# Patient Record
Sex: FEMALE | Race: WHITE | URBAN - METROPOLITAN AREA
[De-identification: names, ages, dates, MRNs, and addresses within clinical notes are randomized per-mention and may not be internally consistent; named-entity substitution may affect disease eponyms.]

---

## 2021-01-21 ENCOUNTER — OFFICE VISIT (OUTPATIENT)
Dept: URBAN - METROPOLITAN AREA CLINIC 98 | Facility: CLINIC | Age: 61
End: 2021-01-21

## 2021-02-02 ENCOUNTER — OFFICE VISIT (OUTPATIENT)
Dept: URBAN - METROPOLITAN AREA CLINIC 98 | Facility: CLINIC | Age: 61
End: 2021-02-02
Payer: COMMERCIAL

## 2021-02-02 VITALS
HEIGHT: 65 IN | TEMPERATURE: 96.8 F | SYSTOLIC BLOOD PRESSURE: 128 MMHG | HEART RATE: 83 BPM | DIASTOLIC BLOOD PRESSURE: 74 MMHG | BODY MASS INDEX: 28.92 KG/M2 | WEIGHT: 173.6 LBS

## 2021-02-02 DIAGNOSIS — K58.9 IBS (IRRITABLE BOWEL SYNDROME) DIARRHEA PREDOMINANT: ICD-10-CM

## 2021-02-02 DIAGNOSIS — F41.9 ANXIETY: ICD-10-CM

## 2021-02-02 DIAGNOSIS — R15.9 FECAL INCONTINENCE: ICD-10-CM

## 2021-02-02 DIAGNOSIS — K55.9 ISCHEMIC COLITIS: ICD-10-CM

## 2021-02-02 PROCEDURE — G8420 CALC BMI NORM PARAMETERS: HCPCS | Performed by: INTERNAL MEDICINE

## 2021-02-02 PROCEDURE — G8482 FLU IMMUNIZE ORDER/ADMIN: HCPCS | Performed by: INTERNAL MEDICINE

## 2021-02-02 PROCEDURE — 99214 OFFICE O/P EST MOD 30 MIN: CPT | Performed by: INTERNAL MEDICINE

## 2021-02-02 PROCEDURE — G8427 DOCREV CUR MEDS BY ELIG CLIN: HCPCS | Performed by: INTERNAL MEDICINE

## 2021-02-02 PROCEDURE — 3017F COLORECTAL CA SCREEN DOC REV: CPT | Performed by: INTERNAL MEDICINE

## 2021-02-02 RX ORDER — PANTOPRAZOLE SODIUM 40 MG/1
TAKE ONE TABLET BY MOUTH TWICE A DAY FOR 30 DAYS TABLET, DELAYED RELEASE ORAL
Qty: 60 | Refills: 2 | Status: ACTIVE | COMMUNITY
Start: 2020-03-16

## 2021-02-02 RX ORDER — CLONAZEPAM 1 MG/1
TAKE 1 TABLET BY ORAL ROUTE ONCE A DAY (AT BEDTIME) TABLET ORAL 1
Qty: 0 | Refills: 0 | Status: ACTIVE | COMMUNITY
Start: 1900-01-01

## 2021-02-02 RX ORDER — COLESTIPOL HYDROCHLORIDE 1 G/1
TAKE 2 TABLETS (2 GRAM) BY ORAL ROUTE 2 TIMES PER DAY SWALLOWING WHOLE WITH ANY LIQUID. DO NOT CRUSH, CHEW AND/OR DIVIDE TABLET ORAL 2
Qty: 0 | Refills: 0 | Status: ACTIVE | COMMUNITY
Start: 1900-01-01

## 2021-02-02 RX ORDER — DIPHENHYDRAMINE HCL 2 %
TAKE 3 CAPSULES BY ORAL ROUTE ONCE A DAY (AT BEDTIME) CREAM (GRAM) TOPICAL 1
Qty: 0 | Refills: 0 | Status: ACTIVE | COMMUNITY
Start: 1900-01-01

## 2021-02-02 RX ORDER — HYOSCYAMINE SULFATE 0.12 MG/1
TABLET, ORALLY DISINTEGRATING ORAL
Qty: 0 | Refills: 0 | Status: ACTIVE | COMMUNITY
Start: 1900-01-01

## 2021-02-02 RX ORDER — MELATONIN 5 MG
TAKE 3 CAPSULES BY ORAL ROUTE ONCE A DAY (AT BEDTIME) CAPSULE ORAL 1
Qty: 0 | Refills: 0 | Status: ACTIVE | COMMUNITY
Start: 1900-01-01

## 2021-02-02 RX ORDER — HYOSCYAMINE SULFATE 0.12 MG/1
1 TABLET, ORALLY DISINTEGRATING ORAL
Qty: 180 | Refills: 1

## 2021-02-02 RX ORDER — CHLORHEXIDINE GLUCONATE 4 %
TAKE 1 TABLET BY ORAL ROUTE DAILY LIQUID (ML) TOPICAL 1
Qty: 0 | Refills: 0 | Status: ACTIVE | COMMUNITY
Start: 1900-01-01

## 2021-02-02 RX ORDER — SACCHAROMYCES BOULARDII 250 MG
CAPSULE ORAL
Qty: 0 | Refills: 0 | Status: ACTIVE | COMMUNITY
Start: 1900-01-01

## 2021-02-02 RX ORDER — BUPROPION HYDROCHLORIDE 200 MG/1
TAKE 1 TABLET (200 MG) BY ORAL ROUTE 2 TIMES PER DAY TABLET, FILM COATED ORAL 2
Qty: 0 | Refills: 0 | Status: ACTIVE | COMMUNITY
Start: 1900-01-01

## 2021-02-02 NOTE — HPI-TODAY'S VISIT:
Patient has history of ischemic colitis in the past.  Patient has history of GI bleeding secondary to NSAIDs.  Status post cholecystectomy.  Gastric ulcer.  History of fecal incontinence.  Patient had colonoscopy November 2019.  Scar noted descending colon and splenic flexure most likely from prior ischemic colitis.  Diverticulosis.  No polyp or neoplasm.  Abdominal CAT scan August 2018.  Hepatic cysts.  Status post cholecystectomy.  Pancreas normal.  Hiatal hernia. 2/2/21 fecal incontinence intermittent diarrhea cannot work due to problems with cramping and incontinence cognitive issues stress causes above no blood in stool

## 2021-02-02 NOTE — PHYSICAL EXAM GASTROINTESTINAL
Abdomen , soft, nontender, nondistended , no guarding or rigidity , no masses palpable , normal bowel sounds , Liver and Spleen , no hepatomegaly present , no hepatosplenomegaly , liver nontender , spleen not palpableLARGE SCAR , Abdomen , soft, nontender, nondistended , no guarding or rigidity , no masses palpable , normal bowel sounds , Liver and Spleen , no hepatomegaly present , no hepatosplenomegaly , liver nontender , spleen not palpable

## 2021-03-11 ENCOUNTER — TELEPHONE ENCOUNTER (OUTPATIENT)
Dept: URBAN - METROPOLITAN AREA CLINIC 98 | Facility: CLINIC | Age: 61
End: 2021-03-11

## 2021-03-11 RX ORDER — COLESTIPOL HYDROCHLORIDE 1 G/1
2 TABLETS TABLET ORAL TWICE A DAY
Qty: 120 TABLET | Refills: 0

## 2021-03-15 ENCOUNTER — TELEPHONE ENCOUNTER (OUTPATIENT)
Dept: URBAN - METROPOLITAN AREA CLINIC 98 | Facility: CLINIC | Age: 61
End: 2021-03-15

## 2021-03-15 RX ORDER — SUCRALFATE 1 G/1
TAKE 1 TABLET (1 GRAM) BY ORAL ROUTE 2 TIMES PER DAY ON AN EMPTY STOMACH AND AT BEDTIME TABLET ORAL TWICE A DAY
Qty: 60 | Refills: 3
End: 2021-07-13

## 2021-04-05 ENCOUNTER — OFFICE VISIT (OUTPATIENT)
Dept: URBAN - METROPOLITAN AREA TELEHEALTH 2 | Facility: TELEHEALTH | Age: 61
End: 2021-04-05
Payer: COMMERCIAL

## 2021-04-05 DIAGNOSIS — K58.0 IRRITABLE BOWEL SYNDROME WITH DIARRHEA: ICD-10-CM

## 2021-04-05 DIAGNOSIS — F41.9 ANXIETY: ICD-10-CM

## 2021-04-05 DIAGNOSIS — R15.9 FECAL INCONTINENCE: ICD-10-CM

## 2021-04-05 DIAGNOSIS — K55.9 ISCHEMIC COLITIS: ICD-10-CM

## 2021-04-05 PROBLEM — 48694002 ANXIETY: Status: ACTIVE | Noted: 2021-02-02

## 2021-04-05 PROBLEM — 10743008 IRRITABLE BOWEL SYNDROME: Status: ACTIVE | Noted: 2021-02-02

## 2021-04-05 PROBLEM — 30588004 ISCHEMIC COLITIS: Status: ACTIVE | Noted: 2021-02-02

## 2021-04-05 PROCEDURE — 99213 OFFICE O/P EST LOW 20 MIN: CPT | Performed by: INTERNAL MEDICINE

## 2021-04-05 RX ORDER — HYOSCYAMINE SULFATE 0.12 MG/1
1 TABLET, ORALLY DISINTEGRATING ORAL
Qty: 180 | Refills: 1

## 2021-04-05 RX ORDER — CLONAZEPAM 1 MG/1
TAKE 1 TABLET BY ORAL ROUTE ONCE A DAY (AT BEDTIME) TABLET ORAL 1
Qty: 0 | Refills: 0 | Status: ACTIVE | COMMUNITY
Start: 1900-01-01

## 2021-04-05 RX ORDER — PANTOPRAZOLE SODIUM 40 MG/1
TAKE ONE TABLET BY MOUTH TWICE A DAY FOR 30 DAYS TABLET, DELAYED RELEASE ORAL
Qty: 60 | Refills: 2 | Status: ACTIVE | COMMUNITY
Start: 2020-03-16

## 2021-04-05 RX ORDER — CHLORHEXIDINE GLUCONATE 4 %
TAKE 1 TABLET BY ORAL ROUTE DAILY LIQUID (ML) TOPICAL 1
Qty: 0 | Refills: 0 | Status: ACTIVE | COMMUNITY
Start: 1900-01-01

## 2021-04-05 RX ORDER — DIPHENHYDRAMINE HCL 2 %
TAKE 3 CAPSULES BY ORAL ROUTE ONCE A DAY (AT BEDTIME) CREAM (GRAM) TOPICAL 1
Qty: 0 | Refills: 0 | Status: ACTIVE | COMMUNITY
Start: 1900-01-01

## 2021-04-05 RX ORDER — COLESTIPOL HYDROCHLORIDE 1 G/1
2 TABLETS TABLET ORAL TWICE A DAY
Qty: 120 TABLET | Refills: 0 | Status: ACTIVE | COMMUNITY

## 2021-04-05 RX ORDER — MELATONIN 5 MG
TAKE 3 CAPSULES BY ORAL ROUTE ONCE A DAY (AT BEDTIME) CAPSULE ORAL 1
Qty: 0 | Refills: 0 | Status: ACTIVE | COMMUNITY
Start: 1900-01-01

## 2021-04-05 RX ORDER — HYOSCYAMINE SULFATE 0.12 MG/1
1 TABLET, ORALLY DISINTEGRATING ORAL
Qty: 180 | Refills: 1 | Status: ACTIVE | COMMUNITY

## 2021-04-05 RX ORDER — SUCRALFATE 1 G/1
TAKE 1 TABLET (1 GRAM) BY ORAL ROUTE 2 TIMES PER DAY ON AN EMPTY STOMACH AND AT BEDTIME TABLET ORAL TWICE A DAY
Qty: 60 | Refills: 3 | Status: ACTIVE | COMMUNITY
End: 2021-07-13

## 2021-04-05 RX ORDER — SACCHAROMYCES BOULARDII 250 MG
CAPSULE ORAL
Qty: 0 | Refills: 0 | Status: ACTIVE | COMMUNITY
Start: 1900-01-01

## 2021-04-05 RX ORDER — PANTOPRAZOLE SODIUM 40 MG/1
TAKE ONE TABLET BY MOUTH TWICE A DAY FOR 30 DAYS TABLET, DELAYED RELEASE ORAL BID
Qty: 180 | Refills: 2

## 2021-04-05 RX ORDER — BUPROPION HYDROCHLORIDE 200 MG/1
TAKE 1 TABLET (200 MG) BY ORAL ROUTE 2 TIMES PER DAY TABLET, FILM COATED ORAL 2
Qty: 0 | Refills: 0 | Status: ACTIVE | COMMUNITY
Start: 1900-01-01

## 2021-04-05 NOTE — HPI-TODAY'S VISIT:
Patient has history of ischemic colitis in the past.  Patient has history of GI bleeding secondary to NSAIDs.  Status post cholecystectomy.  Gastric ulcer.  History of fecal incontinence.  Patient had colonoscopy November 2019.  Scar noted descending colon and splenic flexure most likely from prior ischemic colitis.  Diverticulosis.  No polyp or neoplasm.  Abdominal CAT scan August 2018.  Hepatic cysts.  Status post cholecystectomy.  Pancreas normal.  Hiatal hernia. 2/2/21 fecal incontinence intermittent diarrhea cannot work due to problems with cramping and incontinence cognitive issues stress causes above no blood in stool 4/5/21 epigastric pain sucralfate and prilosec starting to help

## 2021-04-19 ENCOUNTER — TELEPHONE ENCOUNTER (OUTPATIENT)
Dept: URBAN - METROPOLITAN AREA CLINIC 98 | Facility: CLINIC | Age: 61
End: 2021-04-19

## 2021-04-21 ENCOUNTER — OFFICE VISIT (OUTPATIENT)
Dept: URBAN - METROPOLITAN AREA SURGERY CENTER 18 | Facility: SURGERY CENTER | Age: 61
End: 2021-04-21
Payer: COMMERCIAL

## 2021-04-21 DIAGNOSIS — K29.30 CHRONIC SUPERFICIAL GASTRITIS: ICD-10-CM

## 2021-04-21 PROCEDURE — 43239 EGD BIOPSY SINGLE/MULTIPLE: CPT | Performed by: INTERNAL MEDICINE

## 2021-04-21 PROCEDURE — G8907 PT DOC NO EVENTS ON DISCHARG: HCPCS | Performed by: INTERNAL MEDICINE

## 2021-04-21 RX ORDER — CLONAZEPAM 1 MG/1
TAKE 1 TABLET BY ORAL ROUTE ONCE A DAY (AT BEDTIME) TABLET ORAL 1
Qty: 0 | Refills: 0 | Status: ACTIVE | COMMUNITY
Start: 1900-01-01

## 2021-04-21 RX ORDER — COLESTIPOL HYDROCHLORIDE 1 G/1
2 TABLETS TABLET ORAL TWICE A DAY
Qty: 120 TABLET | Refills: 0 | Status: ACTIVE | COMMUNITY

## 2021-04-21 RX ORDER — SACCHAROMYCES BOULARDII 250 MG
CAPSULE ORAL
Qty: 0 | Refills: 0 | Status: ACTIVE | COMMUNITY
Start: 1900-01-01

## 2021-04-21 RX ORDER — MELATONIN 5 MG
TAKE 3 CAPSULES BY ORAL ROUTE ONCE A DAY (AT BEDTIME) CAPSULE ORAL 1
Qty: 0 | Refills: 0 | Status: ACTIVE | COMMUNITY
Start: 1900-01-01

## 2021-04-21 RX ORDER — DIPHENHYDRAMINE HCL 2 %
TAKE 3 CAPSULES BY ORAL ROUTE ONCE A DAY (AT BEDTIME) CREAM (GRAM) TOPICAL 1
Qty: 0 | Refills: 0 | Status: ACTIVE | COMMUNITY
Start: 1900-01-01

## 2021-04-21 RX ORDER — HYOSCYAMINE SULFATE 0.12 MG/1
1 TABLET, ORALLY DISINTEGRATING ORAL
Qty: 180 | Refills: 1 | Status: ACTIVE | COMMUNITY

## 2021-04-21 RX ORDER — CHLORHEXIDINE GLUCONATE 4 %
TAKE 1 TABLET BY ORAL ROUTE DAILY LIQUID (ML) TOPICAL 1
Qty: 0 | Refills: 0 | Status: ACTIVE | COMMUNITY
Start: 1900-01-01

## 2021-04-21 RX ORDER — SUCRALFATE 1 G/1
TAKE 1 TABLET (1 GRAM) BY ORAL ROUTE 2 TIMES PER DAY ON AN EMPTY STOMACH AND AT BEDTIME TABLET ORAL TWICE A DAY
Qty: 60 | Refills: 3 | Status: ACTIVE | COMMUNITY
End: 2021-07-13

## 2021-04-21 RX ORDER — BUPROPION HYDROCHLORIDE 200 MG/1
TAKE 1 TABLET (200 MG) BY ORAL ROUTE 2 TIMES PER DAY TABLET, FILM COATED ORAL 2
Qty: 0 | Refills: 0 | Status: ACTIVE | COMMUNITY
Start: 1900-01-01

## 2021-04-21 RX ORDER — PANTOPRAZOLE SODIUM 40 MG/1
TAKE ONE TABLET BY MOUTH TWICE A DAY FOR 30 DAYS TABLET, DELAYED RELEASE ORAL BID
Qty: 180 | Refills: 2 | Status: ACTIVE | COMMUNITY

## 2021-04-23 ENCOUNTER — TELEPHONE ENCOUNTER (OUTPATIENT)
Dept: URBAN - METROPOLITAN AREA CLINIC 98 | Facility: CLINIC | Age: 61
End: 2021-04-23

## 2021-04-23 RX ORDER — SUCRALFATE 1 G/1
TAKE 1 TABLET (1 GRAM) BY ORAL ROUTE 2 TIMES PER DAY ON AN EMPTY STOMACH AND AT BEDTIME TABLET ORAL QID
Qty: 36 | Refills: 3
End: 2022-04-18

## 2021-04-26 ENCOUNTER — TELEPHONE ENCOUNTER (OUTPATIENT)
Dept: URBAN - METROPOLITAN AREA CLINIC 98 | Facility: CLINIC | Age: 61
End: 2021-04-26

## 2021-04-26 RX ORDER — SUCRALFATE 1 G/1
TAKE 1 TABLET (1 GRAM) BY ORAL ROUTE 2 TIMES PER DAY ON AN EMPTY STOMACH AND AT BEDTIME TABLET ORAL QID
Qty: 360 | Refills: 3
End: 2022-04-21

## 2021-04-29 ENCOUNTER — TELEPHONE ENCOUNTER (OUTPATIENT)
Dept: URBAN - METROPOLITAN AREA CLINIC 3 | Facility: CLINIC | Age: 61
End: 2021-04-29

## 2021-04-29 RX ORDER — SUCRALFATE 1 G/1
TID TABLET ORAL TID
Qty: 270 | Refills: 1
End: 2021-10-26

## 2021-05-24 ENCOUNTER — OFFICE VISIT (OUTPATIENT)
Dept: URBAN - METROPOLITAN AREA CLINIC 98 | Facility: CLINIC | Age: 61
End: 2021-05-24
Payer: COMMERCIAL

## 2021-05-24 ENCOUNTER — TELEPHONE ENCOUNTER (OUTPATIENT)
Dept: URBAN - METROPOLITAN AREA CLINIC 98 | Facility: CLINIC | Age: 61
End: 2021-05-24

## 2021-05-24 VITALS
WEIGHT: 177.6 LBS | HEIGHT: 65 IN | SYSTOLIC BLOOD PRESSURE: 126 MMHG | TEMPERATURE: 97.8 F | BODY MASS INDEX: 29.59 KG/M2 | HEART RATE: 82 BPM | DIASTOLIC BLOOD PRESSURE: 74 MMHG

## 2021-05-24 DIAGNOSIS — K29.90 GASTRITIS AND DUODENITIS: ICD-10-CM

## 2021-05-24 DIAGNOSIS — R15.9 FECAL INCONTINENCE: ICD-10-CM

## 2021-05-24 DIAGNOSIS — K44.9 HIATAL HERNIA: ICD-10-CM

## 2021-05-24 PROCEDURE — 99214 OFFICE O/P EST MOD 30 MIN: CPT | Performed by: INTERNAL MEDICINE

## 2021-05-24 RX ORDER — HYOSCYAMINE SULFATE 0.12 MG/1
1 TABLET, ORALLY DISINTEGRATING ORAL
Qty: 180 | Refills: 1
End: 2021-11-20

## 2021-05-24 RX ORDER — BUPROPION HYDROCHLORIDE 200 MG/1
TAKE 1 TABLET (200 MG) BY ORAL ROUTE 2 TIMES PER DAY TABLET, FILM COATED ORAL 2
Qty: 0 | Refills: 0 | Status: ACTIVE | COMMUNITY
Start: 1900-01-01

## 2021-05-24 RX ORDER — MELATONIN 5 MG
TAKE 3 CAPSULES BY ORAL ROUTE ONCE A DAY (AT BEDTIME) CAPSULE ORAL 1
Qty: 0 | Refills: 0 | Status: ACTIVE | COMMUNITY
Start: 1900-01-01

## 2021-05-24 RX ORDER — HYOSCYAMINE SULFATE 0.12 MG/1
1 TABLET, ORALLY DISINTEGRATING ORAL
Qty: 180 | Refills: 1 | Status: ACTIVE | COMMUNITY

## 2021-05-24 RX ORDER — SUCRALFATE 1 G/1
TID TABLET ORAL TID
Qty: 270 | Refills: 1 | Status: ACTIVE | COMMUNITY
End: 2021-10-26

## 2021-05-24 RX ORDER — SUCRALFATE 1 G/1
TID TABLET ORAL TID
Qty: 270 | Refills: 1
End: 2021-11-20

## 2021-05-24 RX ORDER — COLESTIPOL HYDROCHLORIDE 1 G/1
2 TABLETS TABLET ORAL TWICE A DAY
Qty: 120 TABLET | Refills: 0 | Status: ACTIVE | COMMUNITY

## 2021-05-24 RX ORDER — PANTOPRAZOLE SODIUM 40 MG/1
TAKE ONE TABLET BY MOUTH TWICE A DAY FOR 30 DAYS TABLET, DELAYED RELEASE ORAL BID
Qty: 180 | Refills: 2

## 2021-05-24 RX ORDER — CHLORHEXIDINE GLUCONATE 4 %
TAKE 1 TABLET BY ORAL ROUTE DAILY LIQUID (ML) TOPICAL 1
Qty: 0 | Refills: 0 | Status: ACTIVE | COMMUNITY
Start: 1900-01-01

## 2021-05-24 RX ORDER — CLONAZEPAM 1 MG/1
TAKE 1 TABLET BY ORAL ROUTE ONCE A DAY (AT BEDTIME) TABLET ORAL 1
Qty: 0 | Refills: 0 | Status: ACTIVE | COMMUNITY
Start: 1900-01-01

## 2021-05-24 RX ORDER — SACCHAROMYCES BOULARDII 250 MG
CAPSULE ORAL
Qty: 0 | Refills: 0 | Status: ACTIVE | COMMUNITY
Start: 1900-01-01

## 2021-05-24 RX ORDER — DIPHENHYDRAMINE HCL 2 %
TAKE 3 CAPSULES BY ORAL ROUTE ONCE A DAY (AT BEDTIME) CREAM (GRAM) TOPICAL 1
Qty: 0 | Refills: 0 | Status: ACTIVE | COMMUNITY
Start: 1900-01-01

## 2021-05-24 RX ORDER — PANTOPRAZOLE SODIUM 40 MG/1
TAKE ONE TABLET BY MOUTH TWICE A DAY FOR 30 DAYS TABLET, DELAYED RELEASE ORAL BID
Qty: 180 | Refills: 2 | Status: ACTIVE | COMMUNITY

## 2021-05-24 NOTE — HPI-TODAY'S VISIT:
Patient has history of ischemic colitis in the past.  Patient has history of GI bleeding secondary to NSAIDs.  Status post cholecystectomy.  Gastric ulcer.  History of fecal incontinence.  Patient had colonoscopy November 2019.  Scar noted descending colon and splenic flexure most likely from prior ischemic colitis.  Diverticulosis.  No polyp or neoplasm.  Abdominal CAT scan August 2018.  Hepatic cysts.  Status post cholecystectomy.  Pancreas normal.  Hiatal hernia. 2/2/21 fecal incontinence intermittent diarrhea cannot work due to problems with cramping and incontinence cognitive issues stress causes above no blood in stool 4/5/21 epigastric pain sucralfate and prilosec starting to help EGD was done April 21, 2021 revealing large hiatal hernia. Gastric erosions. Biopsies revealed mild chronic inactive gastritis. Negative for H. pylori. 5/24/21 Ziti red sause icecream stress cause mid abd pain

## 2021-05-26 LAB
A/G RATIO: 2.2
ALBUMIN: 4.6
ALKALINE PHOSPHATASE: 73
ALT (SGPT): 16
AST (SGOT): 18
BILIRUBIN, TOTAL: 0.3
BUN/CREATININE RATIO: 14
BUN: 16
CALCIUM: 10
CARBON DIOXIDE, TOTAL: 24
CHLORIDE: 106
CREATININE: 1.12
EGFR IF AFRICN AM: 61
EGFR IF NONAFRICN AM: 53
GLOBULIN, TOTAL: 2.1
GLUCOSE: 97
HEMATOCRIT: 44
HEMOGLOBIN: 14.9
MCH: 32.1
MCHC: 33.9
MCV: 95
NRBC: (no result)
PLATELETS: 303
POTASSIUM: 4.1
PROTEIN, TOTAL: 6.7
RBC: 4.64
RDW: 12.4
SODIUM: 144
WBC: 5.6

## 2021-09-15 ENCOUNTER — OFFICE VISIT (OUTPATIENT)
Dept: URBAN - METROPOLITAN AREA CLINIC 96 | Facility: CLINIC | Age: 61
End: 2021-09-15

## 2021-09-15 RX ORDER — HYOSCYAMINE SULFATE 0.12 MG/1
1 TABLET, ORALLY DISINTEGRATING ORAL
Qty: 180 | Refills: 1 | Status: ACTIVE | COMMUNITY
End: 2021-11-20

## 2021-09-15 RX ORDER — SUCRALFATE 1 G/1
TID TABLET ORAL TID
Qty: 270 | Refills: 1 | Status: ACTIVE | COMMUNITY
End: 2021-11-20

## 2021-09-15 RX ORDER — CHLORHEXIDINE GLUCONATE 4 %
TAKE 1 TABLET BY ORAL ROUTE DAILY LIQUID (ML) TOPICAL 1
Qty: 0 | Refills: 0 | Status: ACTIVE | COMMUNITY
Start: 1900-01-01

## 2021-09-15 RX ORDER — CLONAZEPAM 1 MG/1
TAKE 1 TABLET BY ORAL ROUTE ONCE A DAY (AT BEDTIME) TABLET ORAL 1
Qty: 0 | Refills: 0 | Status: ACTIVE | COMMUNITY
Start: 1900-01-01

## 2021-09-15 RX ORDER — MELATONIN 5 MG
TAKE 3 CAPSULES BY ORAL ROUTE ONCE A DAY (AT BEDTIME) CAPSULE ORAL 1
Qty: 0 | Refills: 0 | Status: ACTIVE | COMMUNITY
Start: 1900-01-01

## 2021-09-15 RX ORDER — PANTOPRAZOLE SODIUM 40 MG/1
TAKE ONE TABLET BY MOUTH TWICE A DAY FOR 30 DAYS TABLET, DELAYED RELEASE ORAL BID
Qty: 180 | Refills: 2 | Status: ACTIVE | COMMUNITY

## 2021-09-15 RX ORDER — BUPROPION HYDROCHLORIDE 200 MG/1
TAKE 1 TABLET (200 MG) BY ORAL ROUTE 2 TIMES PER DAY TABLET, FILM COATED ORAL 2
Qty: 0 | Refills: 0 | Status: ACTIVE | COMMUNITY
Start: 1900-01-01

## 2021-09-15 RX ORDER — SACCHAROMYCES BOULARDII 250 MG
CAPSULE ORAL
Qty: 0 | Refills: 0 | Status: ACTIVE | COMMUNITY
Start: 1900-01-01

## 2021-09-15 RX ORDER — DIPHENHYDRAMINE HCL 2 %
TAKE 3 CAPSULES BY ORAL ROUTE ONCE A DAY (AT BEDTIME) CREAM (GRAM) TOPICAL 1
Qty: 0 | Refills: 0 | Status: ACTIVE | COMMUNITY
Start: 1900-01-01

## 2021-09-15 RX ORDER — COLESTIPOL HYDROCHLORIDE 1 G/1
2 TABLETS TABLET ORAL TWICE A DAY
Qty: 120 TABLET | Refills: 0 | Status: ACTIVE | COMMUNITY

## 2021-12-14 ENCOUNTER — WEB ENCOUNTER (OUTPATIENT)
Dept: URBAN - METROPOLITAN AREA CLINIC 96 | Facility: CLINIC | Age: 61
End: 2021-12-14

## 2021-12-14 ENCOUNTER — OFFICE VISIT (OUTPATIENT)
Dept: URBAN - METROPOLITAN AREA CLINIC 96 | Facility: CLINIC | Age: 61
End: 2021-12-14
Payer: COMMERCIAL

## 2021-12-14 VITALS
TEMPERATURE: 98.6 F | BODY MASS INDEX: 29.99 KG/M2 | WEIGHT: 180 LBS | HEART RATE: 91 BPM | DIASTOLIC BLOOD PRESSURE: 78 MMHG | SYSTOLIC BLOOD PRESSURE: 126 MMHG | HEIGHT: 65 IN

## 2021-12-14 DIAGNOSIS — K44.9 HIATAL HERNIA: ICD-10-CM

## 2021-12-14 DIAGNOSIS — R19.7 DIARRHEA, UNSPECIFIED TYPE: ICD-10-CM

## 2021-12-14 DIAGNOSIS — Z87.11 HISTORY OF GASTRIC ULCER: ICD-10-CM

## 2021-12-14 DIAGNOSIS — Z87.19 HISTORY OF ISCHEMIC COLITIS: ICD-10-CM

## 2021-12-14 DIAGNOSIS — R15.9 FECAL INCONTINENCE: ICD-10-CM

## 2021-12-14 DIAGNOSIS — K58.9 IRRITABLE BOWEL SYNDROME, UNSPECIFIED TYPE: ICD-10-CM

## 2021-12-14 DIAGNOSIS — K21.9 GASTROESOPHAGEAL REFLUX DISEASE, UNSPECIFIED WHETHER ESOPHAGITIS PRESENT: ICD-10-CM

## 2021-12-14 DIAGNOSIS — K29.90 GASTRITIS AND DUODENITIS: ICD-10-CM

## 2021-12-14 PROCEDURE — 99214 OFFICE O/P EST MOD 30 MIN: CPT | Performed by: INTERNAL MEDICINE

## 2021-12-14 RX ORDER — BUPROPION HYDROCHLORIDE 200 MG/1
TAKE 1 TABLET (200 MG) BY ORAL ROUTE 2 TIMES PER DAY TABLET, FILM COATED ORAL 2
Qty: 0 | Refills: 0 | Status: ACTIVE | COMMUNITY
Start: 1900-01-01

## 2021-12-14 RX ORDER — SACCHAROMYCES BOULARDII 250 MG
CAPSULE ORAL
Qty: 0 | Refills: 0 | Status: ACTIVE | COMMUNITY
Start: 1900-01-01

## 2021-12-14 RX ORDER — HYOSCYAMINE SULFATE 0.12 MG/1
1 TABLET AS NEEDED TABLET ORAL
Qty: 45 TABLET | Refills: 6 | OUTPATIENT
Start: 2021-12-14 | End: 2022-07-12

## 2021-12-14 RX ORDER — PANTOPRAZOLE SODIUM 40 MG/1
1 TABLET TABLET, DELAYED RELEASE ORAL ONCE A DAY
Qty: 90 TABLET | Refills: 4 | OUTPATIENT
Start: 2021-12-14

## 2021-12-14 RX ORDER — CHLORHEXIDINE GLUCONATE 4 %
TAKE 1 TABLET BY ORAL ROUTE DAILY LIQUID (ML) TOPICAL 1
Qty: 0 | Refills: 0 | Status: ACTIVE | COMMUNITY
Start: 1900-01-01

## 2021-12-14 RX ORDER — CLONAZEPAM 1 MG/1
TAKE 1 TABLET BY ORAL ROUTE ONCE A DAY (AT BEDTIME) TABLET ORAL 1
Qty: 0 | Refills: 0 | Status: ACTIVE | COMMUNITY
Start: 1900-01-01

## 2021-12-14 RX ORDER — PANTOPRAZOLE SODIUM 40 MG/1
TAKE ONE TABLET BY MOUTH TWICE A DAY FOR 30 DAYS TABLET, DELAYED RELEASE ORAL BID
Qty: 180 | Refills: 2 | Status: ACTIVE | COMMUNITY

## 2021-12-14 RX ORDER — COLESTIPOL HYDROCHLORIDE 1 G/1
2 TABLETS TABLET ORAL TWICE A DAY
Qty: 120 TABLET | Refills: 0 | Status: ACTIVE | COMMUNITY

## 2021-12-14 RX ORDER — DIPHENHYDRAMINE HCL 2 %
TAKE 3 CAPSULES BY ORAL ROUTE ONCE A DAY (AT BEDTIME) CREAM (GRAM) TOPICAL 1
Qty: 0 | Refills: 0 | Status: ACTIVE | COMMUNITY
Start: 1900-01-01

## 2021-12-14 RX ORDER — MELATONIN 5 MG
TAKE 3 CAPSULES BY ORAL ROUTE ONCE A DAY (AT BEDTIME) CAPSULE ORAL 1
Qty: 0 | Refills: 0 | Status: ACTIVE | COMMUNITY
Start: 1900-01-01

## 2021-12-14 NOTE — HPI-TODAY'S VISIT:
61 old female long established patient of Dr. Alatorre last seen by him in clinic 5/24/2021.  Now presents for "I want to change doctors".  Per prior notation, prior history of ischemic colitis as well as GI bleeding secondary to NSAID use due to gastric ulcer.  History of fecal incontinence.     Prior colonoscopy November 2019 with scarring noted in the descending colon and splenic flexure likely from prior ischemic colitis.  Diverticulosis, no polyps noted.  EGD 4/21/2021 demonstrated large hiatal hernia, gastric erosions with biopsies negative for H. pylori. Still taking pantoprazole BID and Carafate QID.   Prior history of fecal incontinence as well as intermittent diarrhea.  At prior visit, patient was provided prescription for sucralfate and pantoprazole and advised to follow-up in 3 months.  Labs with creatinine mildly elevated 1.12, normal liver function tests.  CBC White cell count 5.6, hemoglobin normal 14.9.  Patient reports ongoing problems since 2016 when she was inpatient and treated for ischemic colitis per patient. Patient reports has been having intermittent fecal incontinence with intermittent cramping and pain and intermittent diarrhea. Improved with prn Levsin. No rectal bleeding, no unintentional weight loss, no nocturnal sx.   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 Takes meds for anxiety and depression. No SI or HI. Followed by psychiatrist every 3 months.   UTD with primary MD with normal labs, does have osteoporosis in right hip on recent bone density. Takes Tramadol prn HA. Still with occasional Goody's powder.

## 2022-05-25 ENCOUNTER — OUT OF OFFICE VISIT (OUTPATIENT)
Dept: URBAN - METROPOLITAN AREA MEDICAL CENTER 25 | Facility: MEDICAL CENTER | Age: 62
End: 2022-05-25
Payer: COMMERCIAL

## 2022-05-25 DIAGNOSIS — R10.13 ABDOMINAL DISCOMFORT, EPIGASTRIC: ICD-10-CM

## 2022-05-25 DIAGNOSIS — K29.60 ADENOPAPILLOMATOSIS GASTRICA: ICD-10-CM

## 2022-05-25 DIAGNOSIS — Z79.82 ASPIRIN LONG-TERM USE: ICD-10-CM

## 2022-05-25 DIAGNOSIS — K92.1 ACUTE MELENA: ICD-10-CM

## 2022-05-25 PROCEDURE — 43239 EGD BIOPSY SINGLE/MULTIPLE: CPT | Performed by: INTERNAL MEDICINE

## 2022-05-25 PROCEDURE — G8427 DOCREV CUR MEDS BY ELIG CLIN: HCPCS | Performed by: INTERNAL MEDICINE

## 2022-05-25 PROCEDURE — 99222 1ST HOSP IP/OBS MODERATE 55: CPT | Performed by: INTERNAL MEDICINE

## 2022-05-31 ENCOUNTER — TELEPHONE ENCOUNTER (OUTPATIENT)
Dept: URBAN - METROPOLITAN AREA CLINIC 23 | Facility: CLINIC | Age: 62
End: 2022-05-31

## 2022-07-21 ENCOUNTER — WEB ENCOUNTER (OUTPATIENT)
Dept: URBAN - METROPOLITAN AREA CLINIC 96 | Facility: CLINIC | Age: 62
End: 2022-07-21

## 2022-07-27 ENCOUNTER — OFFICE VISIT (OUTPATIENT)
Dept: URBAN - METROPOLITAN AREA CLINIC 96 | Facility: CLINIC | Age: 62
End: 2022-07-27
Payer: COMMERCIAL

## 2022-07-27 VITALS
WEIGHT: 195 LBS | HEIGHT: 65 IN | OXYGEN SATURATION: 96 % | SYSTOLIC BLOOD PRESSURE: 130 MMHG | DIASTOLIC BLOOD PRESSURE: 90 MMHG | HEART RATE: 83 BPM | TEMPERATURE: 98 F | BODY MASS INDEX: 32.49 KG/M2 | RESPIRATION RATE: 17 BRPM

## 2022-07-27 DIAGNOSIS — Z87.11 HISTORY OF GASTRIC ULCER: ICD-10-CM

## 2022-07-27 DIAGNOSIS — K44.9 HIATAL HERNIA: ICD-10-CM

## 2022-07-27 DIAGNOSIS — D64.9 ANEMIA, UNSPECIFIED TYPE: ICD-10-CM

## 2022-07-27 DIAGNOSIS — R15.9 FECAL INCONTINENCE: ICD-10-CM

## 2022-07-27 DIAGNOSIS — K29.90 GASTRITIS AND DUODENITIS: ICD-10-CM

## 2022-07-27 DIAGNOSIS — R19.7 DIARRHEA, UNSPECIFIED TYPE: ICD-10-CM

## 2022-07-27 DIAGNOSIS — Z87.19 HISTORY OF ISCHEMIC COLITIS: ICD-10-CM

## 2022-07-27 DIAGNOSIS — K21.9 GASTROESOPHAGEAL REFLUX DISEASE, UNSPECIFIED WHETHER ESOPHAGITIS PRESENT: ICD-10-CM

## 2022-07-27 DIAGNOSIS — K58.9 IRRITABLE BOWEL SYNDROME, UNSPECIFIED TYPE: ICD-10-CM

## 2022-07-27 PROBLEM — 196731005: Status: ACTIVE | Noted: 2021-05-24

## 2022-07-27 PROCEDURE — 99213 OFFICE O/P EST LOW 20 MIN: CPT | Performed by: INTERNAL MEDICINE

## 2022-07-27 RX ORDER — CLONAZEPAM 1 MG/1
TAKE 1 TABLET BY ORAL ROUTE ONCE A DAY (AT BEDTIME) TABLET ORAL 1
Qty: 0 | Refills: 0 | Status: ACTIVE | COMMUNITY
Start: 1900-01-01

## 2022-07-27 RX ORDER — SACCHAROMYCES BOULARDII 250 MG
CAPSULE ORAL
Qty: 0 | Refills: 0 | Status: ACTIVE | COMMUNITY
Start: 1900-01-01

## 2022-07-27 RX ORDER — PANTOPRAZOLE SODIUM 40 MG/1
TAKE ONE TABLET BY MOUTH TWICE A DAY FOR 30 DAYS TABLET, DELAYED RELEASE ORAL BID
Qty: 180 | Refills: 2 | Status: ON HOLD | COMMUNITY

## 2022-07-27 RX ORDER — QUETIAPINE 50 MG/1
1 TABLET AT BEDTIME TABLET, FILM COATED ORAL ONCE A DAY
Status: ACTIVE | COMMUNITY

## 2022-07-27 RX ORDER — MELATONIN 5 MG
TAKE 3 CAPSULES BY ORAL ROUTE ONCE A DAY (AT BEDTIME) CAPSULE ORAL 1
Qty: 0 | Refills: 0 | Status: ACTIVE | COMMUNITY
Start: 1900-01-01

## 2022-07-27 RX ORDER — BUPROPION HYDROCHLORIDE 200 MG/1
TAKE 1 TABLET (200 MG) BY ORAL ROUTE 2 TIMES PER DAY TABLET, FILM COATED ORAL 2
Qty: 0 | Refills: 0 | Status: ACTIVE | COMMUNITY
Start: 1900-01-01

## 2022-07-27 RX ORDER — DIPHENHYDRAMINE HCL 2 %
TAKE 3 CAPSULES BY ORAL ROUTE ONCE A DAY (AT BEDTIME) CREAM (GRAM) TOPICAL 1
Qty: 0 | Refills: 0 | Status: ACTIVE | COMMUNITY
Start: 1900-01-01

## 2022-07-27 RX ORDER — HYOSCYAMINE SULFATE 0.12 MG/5ML
5 ML AS NEEDED LIQUID ORAL
Status: ACTIVE | COMMUNITY

## 2022-07-27 RX ORDER — CHLORHEXIDINE GLUCONATE 4 %
TAKE 1 TABLET BY ORAL ROUTE DAILY LIQUID (ML) TOPICAL 1
Qty: 0 | Refills: 0 | Status: ACTIVE | COMMUNITY
Start: 1900-01-01

## 2022-07-27 RX ORDER — PANTOPRAZOLE SODIUM 40 MG/1
1 TABLET TABLET, DELAYED RELEASE ORAL ONCE A DAY
Qty: 90 TABLET | Refills: 4 | Status: ACTIVE | COMMUNITY
Start: 2021-12-14

## 2022-07-27 RX ORDER — SUCRALFATE 1 G/1
1 TABLET ON AN EMPTY STOMACH TABLET ORAL TWICE A DAY
Status: ACTIVE | COMMUNITY

## 2022-07-27 RX ORDER — COLESTIPOL HYDROCHLORIDE 1 G/1
2 TABLETS TABLET ORAL TWICE A DAY
Qty: 120 TABLET | Refills: 0 | Status: ACTIVE | COMMUNITY

## 2022-07-27 NOTE — HPI-TODAY'S VISIT:
61 old female long established patient of Dr. Alatorre last seen by him in clinic 5/24/2021.  Seen by myself 12/24/2021. Per prior notation, prior history of ischemic colitis as well as GI bleeding secondary to NSAID use due to gastric ulcer.    Colonoscopy November 2019 with scarring noted in the descending colon and splenic flexure likely from prior ischemic colitis.  Diverticulosis, no polyps noted.  EGD 4/21/2021 demonstrated large hiatal hernia, gastric erosions with biopsies negative for H. pylori. Still taking pantoprazole BID and Carafate QID.   Prior history of fecal incontinence as well as intermittent diarrhea.  At prior visit, patient was provided prescription for sucralfate and pantoprazole.  Patient reports ongoing problems since 2016 when she was inpatient and treated for ischemic colitis per patient. Patient reports has been having intermittent fecal incontinence with intermittent cramping and pain and intermittent diarrhea. Improved with prn Levsin.   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 Takes meds for anxiety and depression. No SI or HI. Followed by psychiatrist every 3 months.   Osteoporosis in right hip on recent bone density. Takes Tramadol prn HA.   Patient was seen as an inpatient consultation by Dr. Parra 5/25/22 after presenting with melena and epigastric pain.  Did report taking Goody's powder as needed headaches.  Noted anemic on admission.  CT imaging demonstrated large hiatal hernia.  EGD performed by Dr. Parra at [facility] 5/25/2022 with large hiatal hernia, gastritis.  Protonix was recommended.  Pathology with gastric biopsies demonstrating chronic inactive gastritis, no H. pylori organisms.  She presents for follow up. She denies any n/v/melena/rectal bleeding/dysphagia/odynophagia. Labs done with primary MD a few weeks ago with improved Hb per patient. No ASA use. No alcohol.

## 2022-10-05 ENCOUNTER — CLAIMS CREATED FROM THE CLAIM WINDOW (OUTPATIENT)
Dept: URBAN - METROPOLITAN AREA CLINIC 19 | Facility: CLINIC | Age: 62
End: 2022-10-05
Payer: COMMERCIAL

## 2022-10-05 ENCOUNTER — WEB ENCOUNTER (OUTPATIENT)
Dept: URBAN - METROPOLITAN AREA CLINIC 19 | Facility: CLINIC | Age: 62
End: 2022-10-05

## 2022-10-05 ENCOUNTER — LAB OUTSIDE AN ENCOUNTER (OUTPATIENT)
Dept: URBAN - METROPOLITAN AREA CLINIC 19 | Facility: CLINIC | Age: 62
End: 2022-10-05

## 2022-10-05 VITALS
SYSTOLIC BLOOD PRESSURE: 126 MMHG | WEIGHT: 196.2 LBS | HEIGHT: 65 IN | HEART RATE: 95 BPM | BODY MASS INDEX: 32.69 KG/M2 | DIASTOLIC BLOOD PRESSURE: 78 MMHG | OXYGEN SATURATION: 98 % | TEMPERATURE: 97.9 F

## 2022-10-05 DIAGNOSIS — Z87.19 HISTORY OF ISCHEMIC COLITIS: ICD-10-CM

## 2022-10-05 DIAGNOSIS — R19.7 DIARRHEA, UNSPECIFIED TYPE: ICD-10-CM

## 2022-10-05 DIAGNOSIS — R15.9 FECAL INCONTINENCE: ICD-10-CM

## 2022-10-05 DIAGNOSIS — K44.9 HIATAL HERNIA: ICD-10-CM

## 2022-10-05 DIAGNOSIS — Z87.11 HISTORY OF GASTRIC ULCER: ICD-10-CM

## 2022-10-05 DIAGNOSIS — K58.9 IRRITABLE BOWEL SYNDROME, UNSPECIFIED TYPE: ICD-10-CM

## 2022-10-05 DIAGNOSIS — R10.30 LOWER ABDOMINAL PAIN: ICD-10-CM

## 2022-10-05 DIAGNOSIS — R10.84 ABDOMINAL CRAMPING, GENERALIZED: ICD-10-CM

## 2022-10-05 DIAGNOSIS — K58.0 IBS-D: ICD-10-CM

## 2022-10-05 PROBLEM — 10743008: Status: ACTIVE | Noted: 2021-12-14

## 2022-10-05 LAB
ABSOLUTE NRBC COUNT: 0
AG RATIO: 2
ALBUMIN LEVEL: 4.4
ALK PHOS: 95
ALT: 15
ANION GAP: 9
AST: 17
BILIRUBIN TOTAL: 0.3
BUN/CREAT RATIO: 15
BUN: 17
CALCIUM LEVEL: 9.4
CHLORIDE LEVEL: 109
CO2 LEVEL: 21
CREATININE LEVEL: 1.1
CREATININE POC: 1
CRP: 0.56
ESR WESTERGREN: 13
GFR 2021: 56
GLUCOSE LEVEL: 96
HCT: 42.8
HGB: 14.1
MCH: 30.5
MCHC: 32.9
MCV: 92.4
MPV: 10.4
NRBC AUTO: 0
OSMO (CALC): 279
PERFORMING LAB: (no result)
PLATELETS: 316
POTASSIUM LEVEL: 4.1
PROTEIN TOTAL: 7.4
RBC: 4.63
RDW: 15.9
SODIUM LEVEL: 139
WBC: 6.4

## 2022-10-05 PROCEDURE — 99214 OFFICE O/P EST MOD 30 MIN: CPT | Performed by: NURSE PRACTITIONER

## 2022-10-05 PROCEDURE — 99214 OFFICE O/P EST MOD 30 MIN: CPT | Performed by: STUDENT IN AN ORGANIZED HEALTH CARE EDUCATION/TRAINING PROGRAM

## 2022-10-05 RX ORDER — MELATONIN 5 MG
TAKE 3 CAPSULES BY ORAL ROUTE ONCE A DAY (AT BEDTIME) CAPSULE ORAL 1
Qty: 0 | Refills: 0 | Status: ACTIVE | COMMUNITY
Start: 1900-01-01

## 2022-10-05 RX ORDER — CHLORHEXIDINE GLUCONATE 4 %
TAKE 1 TABLET BY ORAL ROUTE DAILY LIQUID (ML) TOPICAL 1
Qty: 0 | Refills: 0 | Status: ACTIVE | COMMUNITY
Start: 1900-01-01

## 2022-10-05 RX ORDER — COLESTIPOL HYDROCHLORIDE 1 G/1
2 TABLETS TABLET ORAL TWICE A DAY
Qty: 120 TABLET | Refills: 0 | Status: ACTIVE | COMMUNITY

## 2022-10-05 RX ORDER — PANTOPRAZOLE SODIUM 40 MG/1
1 TABLET TABLET, DELAYED RELEASE ORAL ONCE A DAY
Qty: 90 TABLET | Refills: 4 | Status: ACTIVE | COMMUNITY
Start: 2021-12-14

## 2022-10-05 RX ORDER — PANTOPRAZOLE SODIUM 40 MG/1
TAKE ONE TABLET BY MOUTH TWICE A DAY FOR 30 DAYS TABLET, DELAYED RELEASE ORAL BID
Qty: 180 | Refills: 2 | Status: ON HOLD | COMMUNITY

## 2022-10-05 RX ORDER — DIPHENHYDRAMINE HCL 2 %
TAKE 3 CAPSULES BY ORAL ROUTE ONCE A DAY (AT BEDTIME) CREAM (GRAM) TOPICAL 1
Qty: 0 | Refills: 0 | Status: ACTIVE | COMMUNITY
Start: 1900-01-01

## 2022-10-05 RX ORDER — BUPROPION HYDROCHLORIDE 200 MG/1
TAKE 1 TABLET (200 MG) BY ORAL ROUTE 2 TIMES PER DAY TABLET, FILM COATED ORAL 2
Qty: 0 | Refills: 0 | Status: ACTIVE | COMMUNITY
Start: 1900-01-01

## 2022-10-05 RX ORDER — SACCHAROMYCES BOULARDII 250 MG
CAPSULE ORAL
Qty: 0 | Refills: 0 | Status: ACTIVE | COMMUNITY
Start: 1900-01-01

## 2022-10-05 RX ORDER — HYOSCYAMINE SULFATE 0.12 MG/5ML
5 ML AS NEEDED LIQUID ORAL
Status: ACTIVE | COMMUNITY

## 2022-10-05 RX ORDER — SUCRALFATE 1 G/1
1 TABLET ON AN EMPTY STOMACH TABLET ORAL TWICE A DAY
Status: ACTIVE | COMMUNITY

## 2022-10-05 RX ORDER — QUETIAPINE 50 MG/1
1 TABLET AT BEDTIME TABLET, FILM COATED ORAL ONCE A DAY
Status: ACTIVE | COMMUNITY

## 2022-10-05 RX ORDER — CLONAZEPAM 1 MG/1
TAKE 1 TABLET BY ORAL ROUTE ONCE A DAY (AT BEDTIME) TABLET ORAL 1
Qty: 0 | Refills: 0 | Status: ACTIVE | COMMUNITY
Start: 1900-01-01

## 2022-10-05 NOTE — PHYSICAL EXAM GASTROINTESTINAL
Abdomen , soft, RLQ tenderness, nondistended , no guarding or rigidity , normal bowel sounds , no hepatomegaly present

## 2022-10-05 NOTE — HPI-TODAY'S VISIT:
Michelle is a 62-year-old female who presents to the office for diarrhea and fecal incontinence. She was previously seen at Dr. Nieves on 7/27/2022 for hospital follow-up.  She was in the hospital May/2022 for melena and epigastric pain, did report taking Goody powders at that time.  CT scan showed large hiatal hernia.  EGD performed by Dr. Jacobs on 5/25/2022 showed large hiatal hernia, gastritis with biopsy showing inactive gastritis, negative for H. pylori She has had diarrhea and fecal incontinence for years, was placed on Levsin at her office visit with DR Orourke.  Today she presents for urgent same day appointment for lower abdominal pain, fecal incontinence. Her pain started last friday, dull ache/sharp, bloating, constant, nothing makes it better/worse. Stools are loose, having mushy loose stools throughout the day with constant rectal leakage. Denies bloody stools. She has taken 1 dose of her colestipol today, no difference yet.  She had bladder function test last week, and had to take a dose of Cipro, symptoms started after her procedure.   She has not completed stool studies or had any imaging recently.     . Summary of previous note: She has a history of ischemic colitis and GI bleeding secondary to NSAID use/gastric ulcer.   Colonoscopy 11/2019 showed scarring in the descending colon and splenic flexure likely from previous ischemic colitis, diverticulosis EGD 4/2021 large hiatal hernia, gastric erosions.  Biopsies were negative gallbladder has been removed

## 2022-11-01 ENCOUNTER — OFFICE VISIT (OUTPATIENT)
Dept: URBAN - METROPOLITAN AREA CLINIC 96 | Facility: CLINIC | Age: 62
End: 2022-11-01

## 2022-11-29 ENCOUNTER — OFFICE VISIT (OUTPATIENT)
Dept: URBAN - METROPOLITAN AREA CLINIC 96 | Facility: CLINIC | Age: 62
End: 2022-11-29

## 2023-01-19 ENCOUNTER — WEB ENCOUNTER (OUTPATIENT)
Dept: URBAN - METROPOLITAN AREA CLINIC 98 | Facility: CLINIC | Age: 63
End: 2023-01-19

## 2023-01-19 ENCOUNTER — OFFICE VISIT (OUTPATIENT)
Dept: URBAN - METROPOLITAN AREA CLINIC 98 | Facility: CLINIC | Age: 63
End: 2023-01-19
Payer: COMMERCIAL

## 2023-01-19 VITALS
TEMPERATURE: 97.2 F | HEART RATE: 81 BPM | BODY MASS INDEX: 32.46 KG/M2 | HEIGHT: 65 IN | SYSTOLIC BLOOD PRESSURE: 142 MMHG | DIASTOLIC BLOOD PRESSURE: 83 MMHG | WEIGHT: 194.8 LBS

## 2023-01-19 DIAGNOSIS — R19.7 DIARRHEA, UNSPECIFIED TYPE: ICD-10-CM

## 2023-01-19 DIAGNOSIS — K21.9 GASTROESOPHAGEAL REFLUX DISEASE, UNSPECIFIED WHETHER ESOPHAGITIS PRESENT: ICD-10-CM

## 2023-01-19 DIAGNOSIS — K44.9 HIATAL HERNIA: ICD-10-CM

## 2023-01-19 DIAGNOSIS — R15.9 FECAL INCONTINENCE: ICD-10-CM

## 2023-01-19 DIAGNOSIS — R10.30 LOWER ABDOMINAL PAIN: ICD-10-CM

## 2023-01-19 PROBLEM — 235595009: Status: ACTIVE | Noted: 2021-12-14

## 2023-01-19 PROCEDURE — 99213 OFFICE O/P EST LOW 20 MIN: CPT

## 2023-01-19 RX ORDER — BUPROPION HYDROCHLORIDE 200 MG/1
TAKE 1 TABLET (200 MG) BY ORAL ROUTE 2 TIMES PER DAY TABLET, FILM COATED ORAL 2
Qty: 0 | Refills: 0 | Status: ACTIVE | COMMUNITY
Start: 1900-01-01

## 2023-01-19 RX ORDER — PANTOPRAZOLE SODIUM 40 MG/1
TAKE ONE TABLET BY MOUTH TWICE A DAY FOR 30 DAYS TABLET, DELAYED RELEASE ORAL BID
Qty: 180 | Refills: 2 | Status: ON HOLD | COMMUNITY

## 2023-01-19 RX ORDER — DIPHENHYDRAMINE HCL 2 %
TAKE 3 CAPSULES BY ORAL ROUTE ONCE A DAY (AT BEDTIME) CREAM (GRAM) TOPICAL 1
Qty: 0 | Refills: 0 | Status: ACTIVE | COMMUNITY
Start: 1900-01-01

## 2023-01-19 RX ORDER — CLONAZEPAM 1 MG/1
TAKE 1 TABLET BY ORAL ROUTE ONCE A DAY (AT BEDTIME) TABLET ORAL 1
Qty: 0 | Refills: 0 | Status: ACTIVE | COMMUNITY
Start: 1900-01-01

## 2023-01-19 RX ORDER — QUETIAPINE 50 MG/1
1 TABLET AT BEDTIME TABLET, FILM COATED ORAL ONCE A DAY
Status: ACTIVE | COMMUNITY

## 2023-01-19 RX ORDER — MELATONIN 5 MG
TAKE 3 CAPSULES BY ORAL ROUTE ONCE A DAY (AT BEDTIME) CAPSULE ORAL 1
Qty: 0 | Refills: 0 | Status: ACTIVE | COMMUNITY
Start: 1900-01-01

## 2023-01-19 RX ORDER — HYOSCYAMINE SULFATE 0.12 MG/5ML
5 ML AS NEEDED LIQUID ORAL
Status: ON HOLD | COMMUNITY

## 2023-01-19 RX ORDER — SUCRALFATE 1 G/1
1 TABLET ON AN EMPTY STOMACH TABLET ORAL TWICE A DAY
Status: ACTIVE | COMMUNITY

## 2023-01-19 RX ORDER — CHLORHEXIDINE GLUCONATE 4 %
TAKE 1 TABLET BY ORAL ROUTE DAILY LIQUID (ML) TOPICAL 1
Qty: 0 | Refills: 0 | Status: ACTIVE | COMMUNITY
Start: 1900-01-01

## 2023-01-19 RX ORDER — COLESTIPOL HYDROCHLORIDE 1 G/1
2 TABLETS TABLET ORAL TWICE A DAY
Qty: 120 TABLET | Refills: 0 | Status: ACTIVE | COMMUNITY

## 2023-01-19 RX ORDER — SACCHAROMYCES BOULARDII 250 MG
CAPSULE ORAL
Qty: 0 | Refills: 0 | Status: ACTIVE | COMMUNITY
Start: 1900-01-01

## 2023-01-19 RX ORDER — PANTOPRAZOLE SODIUM 40 MG/1
1 TABLET TABLET, DELAYED RELEASE ORAL ONCE A DAY
Qty: 90 TABLET | Refills: 4 | Status: ACTIVE | COMMUNITY
Start: 2021-12-14

## 2023-01-19 NOTE — HPI-TODAY'S VISIT:
Patient is a 62-year-old female who presents to discuss diarrhea.   Has a past medical history of ischemic colitis as well as GI bleeding secondary to NSAID use due to gastric ulcer.  Colonoscopy completed November 2019 with scarring noted in the descending colon and splenic flexure likely from ischemic colitis.  Diverticulosis with no polyps noted.   EGD completed 4/21/2021 which demonstrated large hiatal hernia, gastric erosions with biopsies negative for H. pylori.   Currently taking pantoprazole once a day and Carafate 4 times a day.   Repeat upper endoscopy was completed inpatient on 5/25/2022 due to melena and epigastric pain.  Findings included large hiatal hernia, gastritis.  Protonix was recommended.  Pathology with gastric biopsies demonstrating chronic inactive gastritis with no H. pylori organisms.   Previously been prescribed Levsin for intermittent episodes of diarrhea.    Last seen on 10/5/2022  by Barbara Jimenez for diarrhea and fecal incontinence.   Stool studies were ordered but were not completed.  Labs were done which revealed normal sed rate.  CRP mildly elevated at 0.565.  Blood counts found to be stable with a normal hemoglobin at 14.1 and hematocrit 42.8.   She was advised to increase colestipol to twice a day.   Stat CT scan was done on 10/5/2022 which revealed no acute abnormalities identified in the abdomen or pelvis.  Large hiatal hernia noted and nonobstructive left lower pole renal calculus. Just established care with CRS Dr. Rocha who found patient to have rectocele. Recommended she pursue pelvic floor therapy which she has done for the past few weeks. Does not feels he has had any improvement  She reports she does not feel urge to have a BM and will just have incontinence episode  She will have a BM every 3 days at best Can have straining with BMs  Denies BRBPR or melena  Recently has been Diagnosed with ASD with psychiatry  Diagnosed with sjogrens by rheumatologist

## 2023-02-13 ENCOUNTER — WEB ENCOUNTER (OUTPATIENT)
Dept: URBAN - METROPOLITAN AREA CLINIC 96 | Facility: CLINIC | Age: 63
End: 2023-02-13

## 2023-02-14 ENCOUNTER — WEB ENCOUNTER (OUTPATIENT)
Dept: URBAN - METROPOLITAN AREA CLINIC 96 | Facility: CLINIC | Age: 63
End: 2023-02-14

## 2023-03-29 ENCOUNTER — OFFICE VISIT (OUTPATIENT)
Dept: URBAN - METROPOLITAN AREA CLINIC 96 | Facility: CLINIC | Age: 63
End: 2023-03-29

## 2023-04-03 ENCOUNTER — TELEPHONE ENCOUNTER (OUTPATIENT)
Dept: URBAN - METROPOLITAN AREA CLINIC 98 | Facility: CLINIC | Age: 63
End: 2023-04-03

## 2023-04-04 ENCOUNTER — WEB ENCOUNTER (OUTPATIENT)
Dept: URBAN - METROPOLITAN AREA CLINIC 96 | Facility: CLINIC | Age: 63
End: 2023-04-04

## 2023-04-11 ENCOUNTER — DASHBOARD ENCOUNTERS (OUTPATIENT)
Age: 63
End: 2023-04-11

## 2023-04-11 ENCOUNTER — OFFICE VISIT (OUTPATIENT)
Dept: URBAN - METROPOLITAN AREA CLINIC 96 | Facility: CLINIC | Age: 63
End: 2023-04-11
Payer: COMMERCIAL

## 2023-04-11 ENCOUNTER — LAB OUTSIDE AN ENCOUNTER (OUTPATIENT)
Dept: URBAN - METROPOLITAN AREA CLINIC 96 | Facility: CLINIC | Age: 63
End: 2023-04-11

## 2023-04-11 VITALS
HEIGHT: 65 IN | DIASTOLIC BLOOD PRESSURE: 100 MMHG | SYSTOLIC BLOOD PRESSURE: 154 MMHG | WEIGHT: 181 LBS | BODY MASS INDEX: 30.16 KG/M2 | TEMPERATURE: 98.2 F

## 2023-04-11 DIAGNOSIS — R15.9 FECAL INCONTINENCE: ICD-10-CM

## 2023-04-11 DIAGNOSIS — R10.13 EPIGASTRIC ABDOMINAL PAIN: ICD-10-CM

## 2023-04-11 DIAGNOSIS — K44.9 HIATAL HERNIA: ICD-10-CM

## 2023-04-11 PROCEDURE — 99214 OFFICE O/P EST MOD 30 MIN: CPT

## 2023-04-11 RX ORDER — SUCRALFATE 1 G/1
1 TABLET ON AN EMPTY STOMACH TABLET ORAL TWICE A DAY
Status: ACTIVE | COMMUNITY

## 2023-04-11 RX ORDER — BUPROPION HYDROCHLORIDE 200 MG/1
TAKE 1 TABLET (200 MG) BY ORAL ROUTE 2 TIMES PER DAY TABLET, FILM COATED ORAL 2
Qty: 0 | Refills: 0 | Status: ACTIVE | COMMUNITY
Start: 1900-01-01

## 2023-04-11 RX ORDER — DIPHENHYDRAMINE HCL 2 %
TAKE 3 CAPSULES BY ORAL ROUTE ONCE A DAY (AT BEDTIME) CREAM (GRAM) TOPICAL 1
Qty: 0 | Refills: 0 | Status: ACTIVE | COMMUNITY
Start: 1900-01-01

## 2023-04-11 RX ORDER — QUETIAPINE 50 MG/1
1 TABLET AT BEDTIME TABLET, FILM COATED ORAL ONCE A DAY
Status: ACTIVE | COMMUNITY

## 2023-04-11 RX ORDER — CHLORHEXIDINE GLUCONATE 4 %
TAKE 1 TABLET BY ORAL ROUTE DAILY LIQUID (ML) TOPICAL 1
Qty: 0 | Refills: 0 | Status: ACTIVE | COMMUNITY
Start: 1900-01-01

## 2023-04-11 RX ORDER — SACCHAROMYCES BOULARDII 250 MG
CAPSULE ORAL
Qty: 0 | Refills: 0 | Status: ACTIVE | COMMUNITY
Start: 1900-01-01

## 2023-04-11 RX ORDER — CLONAZEPAM 1 MG/1
TAKE 1 TABLET BY ORAL ROUTE ONCE A DAY (AT BEDTIME) TABLET ORAL 1
Qty: 0 | Refills: 0 | Status: ACTIVE | COMMUNITY
Start: 1900-01-01

## 2023-04-11 RX ORDER — PANTOPRAZOLE SODIUM 40 MG/1
1 TABLET TABLET, DELAYED RELEASE ORAL ONCE A DAY
Qty: 90 TABLET | Refills: 4 | Status: ACTIVE | COMMUNITY
Start: 2021-12-14

## 2023-04-11 RX ORDER — HYOSCYAMINE SULFATE 0.12 MG/5ML
5 ML AS NEEDED LIQUID ORAL
Status: ACTIVE | COMMUNITY

## 2023-04-11 RX ORDER — PANTOPRAZOLE SODIUM 40 MG/1
1 TABLET TABLET, DELAYED RELEASE ORAL TWICE A DAY
Qty: 60 TABLET | Refills: 3 | OUTPATIENT
Start: 2023-04-11

## 2023-04-11 RX ORDER — COLESTIPOL HYDROCHLORIDE 1 G/1
2 TABLETS TABLET ORAL TWICE A DAY
Qty: 120 TABLET | Refills: 0 | Status: ACTIVE | COMMUNITY

## 2023-04-11 RX ORDER — PANTOPRAZOLE SODIUM 40 MG/1
TAKE ONE TABLET BY MOUTH TWICE A DAY FOR 30 DAYS TABLET, DELAYED RELEASE ORAL BID
Qty: 180 | Refills: 2 | Status: ACTIVE | COMMUNITY

## 2023-04-11 RX ORDER — MELATONIN 5 MG
TAKE 3 CAPSULES BY ORAL ROUTE ONCE A DAY (AT BEDTIME) CAPSULE ORAL 1
Qty: 0 | Refills: 0 | Status: ACTIVE | COMMUNITY
Start: 1900-01-01

## 2023-04-11 NOTE — HPI-TODAY'S VISIT:
Previously in 1/2023, Patient is a 62-year-old female who presents to discuss diarrhea.   Has a past medical history of ischemic colitis as well as GI bleeding secondary to NSAID use due to gastric ulcer.  Colonoscopy completed November 2019 with scarring noted in the descending colon and splenic flexure likely from ischemic colitis.  Diverticulosis with no polyps noted.   EGD completed 4/21/2021 which demonstrated large hiatal hernia, gastric erosions with biopsies negative for H. pylori.   Currently taking pantoprazole once a day and Carafate 4 times a day.   Repeat upper endoscopy was completed inpatient on 5/25/2022 due to melena and epigastric pain.  Findings included large hiatal hernia, gastritis.  Protonix was recommended.  Pathology with gastric biopsies demonstrating chronic inactive gastritis with no H. pylori organisms.   Previously been prescribed Levsin for intermittent episodes of diarrhea.    Last seen on 10/5/2022  by Barbara Jimenez for diarrhea and fecal incontinence.   Stool studies were ordered but were not completed.  Labs were done which revealed normal sed rate.  CRP mildly elevated at 0.565.  Blood counts found to be stable with a normal hemoglobin at 14.1 and hematocrit 42.8.   She was advised to increase colestipol to twice a day.   Stat CT scan was done on 10/5/2022 which revealed no acute abnormalities identified in the abdomen or pelvis.  Large hiatal hernia noted and nonobstructive left lower pole renal calculus. Just established care with CRS Dr. Rocha who found patient to have rectocele. Recommended she pursue pelvic floor therapy which she has done for the past few weeks. Does not feels he has had any improvement  She reports she does not feel urge to have a BM and will just have incontinence episode  She will have a BM every 3 days at best Can have straining with BMs  Denies BRBPR or melena  Recently has been Diagnosed with ASD with psychiatry  Diagnosed with sjogrens by rheumatologist . Today on 4/11/2023, Patient presents to follow-up from recent ER visits. Had 2 in the beginning of April.  Discharged on 4/3/2023.  States she presented to the ER with abdominal pain.  CT scan completed which revealed large hiatal hernia without evidence of gastric volvulus or obstruction.   CT scan of the chest also completed which was reassuring  Started on pantoprazole 40mg BID  Prescribed oxy and send home  She went to see naturopath- told she is allergic to grains. Patient endorses her energy was also read  Given food supplements per patient and advised to eat fermented foods 5x a day  Patient feels her gut has calmed down  She will have pain intermittently- once or twice a week. Can last an hours or two. Ranking it a 4-5/10 Denies vomiting episode Trying to eat smaller meals  Dr. Mcfadden giving her sucralfate up to 4 times/day PPI BID  Colonoscopy completed 3/16/2023 by Dr. Rocha.  Findings included normal-appearing ileum.  Transverse colon, ascending colon and cecum all appeared normal.  Erythematous mucosa in the rectum in the sigmoid colon and the descending colon.  Diverticulosis.  Pathology results revealed normal colonic mucosa and random colon biopsy, left colon, sigmoid colon and rectum. Continues to have fecal incontinence- Dr. Rocha has told her spincter is hypotensive  Went to pelvic floor therapy and did not feel it helped  Having a BM 2-3 times/day  Taking fiber daily

## 2023-04-13 ENCOUNTER — OFFICE VISIT (OUTPATIENT)
Dept: URBAN - METROPOLITAN AREA CLINIC 98 | Facility: CLINIC | Age: 63
End: 2023-04-13

## 2023-05-12 ENCOUNTER — CLAIMS CREATED FROM THE CLAIM WINDOW (OUTPATIENT)
Dept: URBAN - METROPOLITAN AREA CLINIC 4 | Facility: CLINIC | Age: 63
End: 2023-05-12
Payer: COMMERCIAL

## 2023-05-12 ENCOUNTER — OFFICE VISIT (OUTPATIENT)
Dept: URBAN - METROPOLITAN AREA SURGERY CENTER 18 | Facility: SURGERY CENTER | Age: 63
End: 2023-05-12
Payer: COMMERCIAL

## 2023-05-12 DIAGNOSIS — K31.89 OTHER DISEASES OF STOMACH AND DUODENUM: ICD-10-CM

## 2023-05-12 DIAGNOSIS — K29.60 ADENOPAPILLOMATOSIS GASTRICA: ICD-10-CM

## 2023-05-12 DIAGNOSIS — K29.70 GASTRITIS, UNSPECIFIED, WITHOUT BLEEDING: ICD-10-CM

## 2023-05-12 DIAGNOSIS — Z87.11 H/O GASTRIC ULCER: ICD-10-CM

## 2023-05-12 PROCEDURE — 43239 EGD BIOPSY SINGLE/MULTIPLE: CPT | Performed by: INTERNAL MEDICINE

## 2023-05-12 PROCEDURE — 88305 TISSUE EXAM BY PATHOLOGIST: CPT | Performed by: PATHOLOGY

## 2023-05-12 PROCEDURE — G8907 PT DOC NO EVENTS ON DISCHARG: HCPCS | Performed by: INTERNAL MEDICINE

## 2023-05-12 PROCEDURE — 88312 SPECIAL STAINS GROUP 1: CPT | Performed by: PATHOLOGY

## 2025-04-23 ENCOUNTER — CLAIMS CREATED FROM THE CLAIM WINDOW (OUTPATIENT)
Dept: URBAN - METROPOLITAN AREA MEDICAL CENTER 25 | Facility: MEDICAL CENTER | Age: 65
End: 2025-04-23

## 2025-04-23 PROCEDURE — 99254 IP/OBS CNSLTJ NEW/EST MOD 60: CPT | Performed by: INTERNAL MEDICINE

## 2025-04-24 ENCOUNTER — CLAIMS CREATED FROM THE CLAIM WINDOW (OUTPATIENT)
Dept: URBAN - METROPOLITAN AREA MEDICAL CENTER 25 | Facility: MEDICAL CENTER | Age: 65
End: 2025-04-24

## 2025-04-24 PROCEDURE — 99232 SBSQ HOSP IP/OBS MODERATE 35: CPT | Performed by: INTERNAL MEDICINE

## 2025-04-25 ENCOUNTER — CLAIMS CREATED FROM THE CLAIM WINDOW (OUTPATIENT)
Dept: URBAN - METROPOLITAN AREA MEDICAL CENTER 25 | Facility: MEDICAL CENTER | Age: 65
End: 2025-04-25

## 2025-04-25 PROCEDURE — 45380 COLONOSCOPY AND BIOPSY: CPT | Performed by: INTERNAL MEDICINE

## 2025-05-06 ENCOUNTER — TELEPHONE ENCOUNTER (OUTPATIENT)
Dept: URBAN - METROPOLITAN AREA CLINIC 19 | Facility: CLINIC | Age: 65
End: 2025-05-06

## 2025-07-29 ENCOUNTER — LAB OUTSIDE AN ENCOUNTER (OUTPATIENT)
Dept: URBAN - METROPOLITAN AREA CLINIC 96 | Facility: CLINIC | Age: 65
End: 2025-07-29

## 2025-07-29 ENCOUNTER — OFFICE VISIT (OUTPATIENT)
Dept: URBAN - METROPOLITAN AREA CLINIC 96 | Facility: CLINIC | Age: 65
End: 2025-07-29
Payer: MEDICARE

## 2025-07-29 DIAGNOSIS — R19.4 CHANGE IN BOWEL HABIT: ICD-10-CM

## 2025-07-29 DIAGNOSIS — R19.7 DIARRHEA, UNSPECIFIED TYPE: ICD-10-CM

## 2025-07-29 DIAGNOSIS — K55.9 ISCHEMIC COLITIS: ICD-10-CM

## 2025-07-29 DIAGNOSIS — R15.9 FECAL INCONTINENCE: ICD-10-CM

## 2025-07-29 DIAGNOSIS — R10.13 EPIGASTRIC ABDOMINAL PAIN: ICD-10-CM

## 2025-07-29 DIAGNOSIS — K44.9 HIATAL HERNIA: ICD-10-CM

## 2025-07-29 PROCEDURE — 99214 OFFICE O/P EST MOD 30 MIN: CPT | Performed by: INTERNAL MEDICINE

## 2025-07-29 RX ORDER — HYOSCYAMINE SULFATE 0.12 MG/5ML
5 ML AS NEEDED ELIXIR ORAL
Status: ACTIVE | COMMUNITY

## 2025-07-29 RX ORDER — COLESTIPOL HYDROCHLORIDE 1 G/1
2 TABLETS TABLET ORAL TWICE A DAY
Qty: 120 TABLET | Refills: 0 | Status: ACTIVE | COMMUNITY

## 2025-07-29 RX ORDER — SACCHAROMYCES BOULARDII 50 MG
CAPSULE ORAL
Qty: 0 | Refills: 0 | Status: ACTIVE | COMMUNITY
Start: 1900-01-01

## 2025-07-29 RX ORDER — MELATONIN 5 MG
TAKE 3 CAPSULES BY ORAL ROUTE ONCE A DAY (AT BEDTIME) CAPSULE ORAL 1
Qty: 0 | Refills: 0 | Status: ACTIVE | COMMUNITY
Start: 1900-01-01

## 2025-07-29 RX ORDER — SUCRALFATE 1 G/1
1 TABLET ON AN EMPTY STOMACH TABLET ORAL TWICE A DAY
Status: ACTIVE | COMMUNITY

## 2025-07-29 RX ORDER — METHYLPHENIDATE HYDROCHLORIDE 36 MG/1
1 TABLET IN THE MORNING TABLET, EXTENDED RELEASE ORAL ONCE A DAY
Status: ACTIVE | COMMUNITY

## 2025-07-29 RX ORDER — BUPROPION HYDROCHLORIDE 200 MG/1
TAKE 1 TABLET (200 MG) BY ORAL ROUTE 2 TIMES PER DAY TABLET, FILM COATED ORAL 2
Qty: 0 | Refills: 0 | Status: ACTIVE | COMMUNITY
Start: 1900-01-01

## 2025-07-29 RX ORDER — DIPHENHYDRAMINE HCL 2 %
TAKE 3 CAPSULES BY ORAL ROUTE ONCE A DAY (AT BEDTIME) CREAM (GRAM) TOPICAL 1
Qty: 0 | Refills: 0 | Status: ON HOLD | COMMUNITY
Start: 1900-01-01

## 2025-07-29 RX ORDER — CLONAZEPAM 1 MG/1
TAKE 1 TABLET BY ORAL ROUTE ONCE A DAY (AT BEDTIME) TABLET ORAL 1
Qty: 0 | Refills: 0 | Status: ACTIVE | COMMUNITY
Start: 1900-01-01

## 2025-07-29 RX ORDER — CHLORHEXIDINE GLUCONATE 4 %
TAKE 1 TABLET BY ORAL ROUTE DAILY LIQUID (ML) TOPICAL 1
Qty: 0 | Refills: 0 | Status: ACTIVE | COMMUNITY
Start: 1900-01-01

## 2025-07-29 RX ORDER — PANTOPRAZOLE SODIUM 40 MG/1
1 TABLET TABLET, DELAYED RELEASE ORAL ONCE A DAY
Qty: 90 TABLET | Refills: 4 | Status: ON HOLD | COMMUNITY
Start: 2021-12-14

## 2025-07-29 RX ORDER — TRAMADOL HYDROCHLORIDE 50 MG/1
1 TABLET AS NEEDED TABLET, FILM COATED ORAL ONCE A DAY
Status: ACTIVE | COMMUNITY

## 2025-07-29 RX ORDER — QUETIAPINE 50 MG/1
1 TABLET AT BEDTIME TABLET, FILM COATED ORAL ONCE A DAY
Status: ON HOLD | COMMUNITY

## 2025-07-29 RX ORDER — DOXEPIN HYDROCHLORIDE 10 MG/1
1 CAPSULE AT BEDTIME CAPSULE ORAL ONCE A DAY
Status: ACTIVE | COMMUNITY

## 2025-07-29 RX ORDER — PANTOPRAZOLE SODIUM 40 MG/1
TAKE ONE TABLET BY MOUTH TWICE A DAY FOR 30 DAYS TABLET, DELAYED RELEASE ORAL BID
Qty: 180 | Refills: 2 | Status: ACTIVE | COMMUNITY

## 2025-07-29 RX ORDER — LURASIDONE HYDROCHLORIDE 20 MG/1
1 TABLET IN THE EVENING WITH FOOD TABLET, FILM COATED ORAL ONCE A DAY
Status: ACTIVE | COMMUNITY

## 2025-07-29 RX ORDER — PANTOPRAZOLE SODIUM 40 MG/1
1 TABLET TABLET, DELAYED RELEASE ORAL TWICE A DAY
Qty: 60 TABLET | Refills: 3 | Status: ON HOLD | COMMUNITY
Start: 2023-04-11

## 2025-07-29 NOTE — HPI-TODAY'S VISIT:
Seen 4/11/2023 by PA.   As noted prior, 64 yo female with history of ischemic colitis as well as GI bleeding secondary to NSAID use due to gastric ulcer.  Colonoscopy 11/2019 with scarring noted in the descending colon and splenic flexure likely from ischemic colitis.  Diverticulosis with no polyps noted. EGD completed 4/21/2021 which demonstrated large hiatal hernia, gastric erosions with biopsies negative for H. pylori. EGD inpatient on 5/25/2022 due to melena and epigastric pain.  Findings included large hiatal hernia, gastritis.  Protonix was recommended.  Pathology with gastric biopsies demonstrating chronic inactive gastritis with no H. pylori organisms.  CT 10/5/2022 which revealed no acute abnormalities identified in the abdomen or pelvis.  Large hiatal hernia noted and nonobstructive left lower pole renal calculus.  Followed by CRS Dr. Rocha who found patient to have rectocele. Recommended she pursue pelvic floor therapy which she has done for the past few weeks. Did not note improvement   Diagnosed with ASD with psychiatry. Diagnosed with sjogrens by rheumatologist . Colonoscopy 3/16/2023 by Dr. Rocha.  Findings included normal-appearing ileum.  Transverse colon, ascending colon and cecum all appeared normal.  Erythematous mucosa in the rectum in the sigmoid colon and the descending colon.  Diverticulosis.  Pathology results revealed normal colonic mucosa and random colon biopsy, left colon, sigmoid colon and rectum. EGD 5/12/2023 with gastric erythema, large hiatal hernia.  Pathology with normal duodenal biopsies, chronic gastritis negative for H. pylori, lower esophageal biopsies without significant abnormality.   Colonoscopy performed by Dr. Parra at [facility] for hematochezia on 4/25/2025 with diverticulosis, otherwise unremarkable to the terminal ileum.  Descending colon biopsies did demonstrate minimal acute inflammation consistent with ischemic colitis in the appropriate clinical setting. Patient reportedly had hypotension hx prior to presentation.  Emani Doran, a 65-year-old female, came in for follow-up after a recent hospitalization for ischemic colitis. About ten days ago, she began experiencing worsening bowel symptoms, with every meal passing quickly through her system. She described her stools as deep mustard in color, foul-smelling, and never formed, sometimes presenting as diarrhea. She reported that bowel movements occasionally occur in her incontinence garments and denied any blood in her stool this time, unlike her previous episode.  Emani expressed concern about not absorbing nutrients and has lost her appetite, leading her to avoid eating meals. She noted unintentional weight loss during this period. To manage her symptoms, she has tried the BRAT diet, fiber supplementation, and continues to take probiotics. She was recently discharged on antibiotics after her hospital stay, where a colonoscopy showed no active bleeding.

## 2025-07-31 LAB
ADENOVIRUS F 40/41: NOT DETECTED
C. DIFFICILE TOXIN A/B, STOOL - QDX: (no result)
CAMPYLOBACTER: NOT DETECTED
CLOSTRIDIUM DIFFICILE: DETECTED
ENTAMOEBA HISTOLYTICA: NOT DETECTED
ENTEROAGGREGATIVE E.COLI: NOT DETECTED
ENTEROTOXIGENIC E.COLI: NOT DETECTED
ESCHERICHIA COLI O157: NOT DETECTED
GIARDIA LAMBLIA: NOT DETECTED
NOROVIRUS GI/GII: NOT DETECTED
ROTAVIRUS A: NOT DETECTED
SALMONELLA SPP.: DETECTED
SHIGA-LIKE TOXIN PRODUCING E.COLI: NOT DETECTED
SHIGELLA SPP. / ENTEROINVASIVE E.COLI: NOT DETECTED
VIBRIO PARAHAEMOLYTICUS: NOT DETECTED
VIBRIO SPP.: NOT DETECTED
YERSINIA ENTEROCOLITICA: NOT DETECTED

## 2025-08-01 ENCOUNTER — TELEPHONE ENCOUNTER (OUTPATIENT)
Dept: URBAN - METROPOLITAN AREA CLINIC 96 | Facility: CLINIC | Age: 65
End: 2025-08-01

## 2025-08-01 RX ORDER — VANCOMYCIN HYDROCHLORIDE 125 MG/1
1 CAPSULE CAPSULE ORAL
Qty: 56 CAPSULE | OUTPATIENT
Start: 2025-08-01 | End: 2025-08-15

## 2025-08-02 ENCOUNTER — TELEPHONE ENCOUNTER (OUTPATIENT)
Dept: URBAN - METROPOLITAN AREA CLINIC 96 | Facility: CLINIC | Age: 65
End: 2025-08-02

## 2025-08-08 ENCOUNTER — TELEPHONE ENCOUNTER (OUTPATIENT)
Dept: URBAN - METROPOLITAN AREA CLINIC 96 | Facility: CLINIC | Age: 65
End: 2025-08-08

## 2025-08-23 ENCOUNTER — TELEPHONE ENCOUNTER (OUTPATIENT)
Dept: URBAN - METROPOLITAN AREA CLINIC 96 | Facility: CLINIC | Age: 65
End: 2025-08-23

## 2025-08-26 ENCOUNTER — OFFICE VISIT (OUTPATIENT)
Dept: URBAN - METROPOLITAN AREA CLINIC 96 | Facility: CLINIC | Age: 65
End: 2025-08-26

## 2025-08-26 RX ORDER — BUPROPION HYDROCHLORIDE 200 MG/1
TAKE 1 TABLET (200 MG) BY ORAL ROUTE 2 TIMES PER DAY TABLET, FILM COATED ORAL 2
Qty: 0 | Refills: 0 | Status: ACTIVE | COMMUNITY
Start: 1900-01-01

## 2025-08-26 RX ORDER — PANTOPRAZOLE SODIUM 40 MG/1
1 TABLET TABLET, DELAYED RELEASE ORAL ONCE A DAY
Qty: 90 TABLET | Refills: 4 | Status: ON HOLD | COMMUNITY
Start: 2021-12-14

## 2025-08-26 RX ORDER — CLONAZEPAM 1 MG/1
TAKE 1 TABLET BY ORAL ROUTE ONCE A DAY (AT BEDTIME) TABLET ORAL 1
Qty: 0 | Refills: 0 | Status: ACTIVE | COMMUNITY
Start: 1900-01-01

## 2025-08-26 RX ORDER — CHLORHEXIDINE GLUCONATE 4 %
TAKE 1 TABLET BY ORAL ROUTE DAILY LIQUID (ML) TOPICAL 1
Qty: 0 | Refills: 0 | Status: ACTIVE | COMMUNITY
Start: 1900-01-01

## 2025-08-26 RX ORDER — DOXEPIN HYDROCHLORIDE 10 MG/1
1 CAPSULE AT BEDTIME CAPSULE ORAL ONCE A DAY
Status: ACTIVE | COMMUNITY

## 2025-08-26 RX ORDER — PANTOPRAZOLE SODIUM 40 MG/1
TAKE ONE TABLET BY MOUTH TWICE A DAY FOR 30 DAYS TABLET, DELAYED RELEASE ORAL BID
Qty: 180 | Refills: 2 | Status: ACTIVE | COMMUNITY

## 2025-08-26 RX ORDER — HYOSCYAMINE SULFATE 0.12 MG/5ML
5 ML AS NEEDED ELIXIR ORAL
Status: ACTIVE | COMMUNITY

## 2025-08-26 RX ORDER — MELATONIN 5 MG
TAKE 3 CAPSULES BY ORAL ROUTE ONCE A DAY (AT BEDTIME) CAPSULE ORAL 1
Qty: 0 | Refills: 0 | Status: ACTIVE | COMMUNITY
Start: 1900-01-01

## 2025-08-26 RX ORDER — DIPHENHYDRAMINE HCL 2 %
TAKE 3 CAPSULES BY ORAL ROUTE ONCE A DAY (AT BEDTIME) CREAM (GRAM) TOPICAL 1
Qty: 0 | Refills: 0 | Status: ON HOLD | COMMUNITY
Start: 1900-01-01

## 2025-08-26 RX ORDER — COLESTIPOL HYDROCHLORIDE 1 G/1
2 TABLETS TABLET ORAL TWICE A DAY
Qty: 120 TABLET | Refills: 0 | Status: ACTIVE | COMMUNITY

## 2025-08-26 RX ORDER — QUETIAPINE 50 MG/1
1 TABLET AT BEDTIME TABLET, FILM COATED ORAL ONCE A DAY
Status: ON HOLD | COMMUNITY

## 2025-08-26 RX ORDER — SUCRALFATE 1 G/1
1 TABLET ON AN EMPTY STOMACH TABLET ORAL TWICE A DAY
Status: ACTIVE | COMMUNITY

## 2025-08-26 RX ORDER — SACCHAROMYCES BOULARDII 50 MG
CAPSULE ORAL
Qty: 0 | Refills: 0 | Status: ACTIVE | COMMUNITY
Start: 1900-01-01

## 2025-08-26 RX ORDER — TRAMADOL HYDROCHLORIDE 50 MG/1
1 TABLET AS NEEDED TABLET, FILM COATED ORAL ONCE A DAY
Status: ACTIVE | COMMUNITY

## 2025-08-26 RX ORDER — METHYLPHENIDATE HYDROCHLORIDE 36 MG/1
1 TABLET IN THE MORNING TABLET, EXTENDED RELEASE ORAL ONCE A DAY
Status: ACTIVE | COMMUNITY

## 2025-08-26 RX ORDER — PANTOPRAZOLE SODIUM 40 MG/1
1 TABLET TABLET, DELAYED RELEASE ORAL TWICE A DAY
Qty: 60 TABLET | Refills: 3 | Status: ON HOLD | COMMUNITY
Start: 2023-04-11

## 2025-08-26 RX ORDER — LURASIDONE HYDROCHLORIDE 20 MG/1
1 TABLET IN THE EVENING WITH FOOD TABLET, FILM COATED ORAL ONCE A DAY
Status: ACTIVE | COMMUNITY